# Patient Record
Sex: MALE | Race: WHITE | NOT HISPANIC OR LATINO | Employment: FULL TIME | URBAN - NONMETROPOLITAN AREA
[De-identification: names, ages, dates, MRNs, and addresses within clinical notes are randomized per-mention and may not be internally consistent; named-entity substitution may affect disease eponyms.]

---

## 2022-11-02 ENCOUNTER — OFFICE VISIT (OUTPATIENT)
Dept: FAMILY MEDICINE CLINIC | Facility: CLINIC | Age: 32
End: 2022-11-02

## 2022-11-02 VITALS
SYSTOLIC BLOOD PRESSURE: 149 MMHG | BODY MASS INDEX: 30.31 KG/M2 | TEMPERATURE: 98.7 F | RESPIRATION RATE: 16 BRPM | WEIGHT: 200 LBS | DIASTOLIC BLOOD PRESSURE: 97 MMHG | HEIGHT: 68 IN | OXYGEN SATURATION: 98 % | HEART RATE: 88 BPM

## 2022-11-02 DIAGNOSIS — R11.0 NAUSEA: ICD-10-CM

## 2022-11-02 DIAGNOSIS — J06.9 UPPER RESPIRATORY TRACT INFECTION, UNSPECIFIED TYPE: Primary | ICD-10-CM

## 2022-11-02 DIAGNOSIS — R10.9 ABDOMINAL PAIN, UNSPECIFIED ABDOMINAL LOCATION: ICD-10-CM

## 2022-11-02 DIAGNOSIS — J02.9 SORE THROAT: ICD-10-CM

## 2022-11-02 DIAGNOSIS — R50.9 FEVER, UNSPECIFIED FEVER CAUSE: ICD-10-CM

## 2022-11-02 LAB
EXPIRATION DATE: NORMAL
EXPIRATION DATE: NORMAL
FLUAV AG NPH QL: NEGATIVE
FLUBV AG NPH QL: NEGATIVE
INTERNAL CONTROL: NORMAL
INTERNAL CONTROL: NORMAL
Lab: NORMAL
Lab: NORMAL
S PYO AG THROAT QL: NEGATIVE

## 2022-11-02 PROCEDURE — 87804 INFLUENZA ASSAY W/OPTIC: CPT | Performed by: NURSE PRACTITIONER

## 2022-11-02 PROCEDURE — 99203 OFFICE O/P NEW LOW 30 MIN: CPT | Performed by: NURSE PRACTITIONER

## 2022-11-02 PROCEDURE — 87880 STREP A ASSAY W/OPTIC: CPT | Performed by: NURSE PRACTITIONER

## 2022-11-02 RX ORDER — BROMPHENIRAMINE MALEATE, PSEUDOEPHEDRINE HYDROCHLORIDE, AND DEXTROMETHORPHAN HYDROBROMIDE 2; 30; 10 MG/5ML; MG/5ML; MG/5ML
5 SYRUP ORAL 4 TIMES DAILY PRN
Qty: 118 ML | Refills: 0 | Status: SHIPPED | OUTPATIENT
Start: 2022-11-02

## 2022-11-02 NOTE — PROGRESS NOTES
Subjective     Chief Complaint   Patient presents with   • Fever   • Sore Throat   • Abdominal Pain   • Nausea       History of Present Illness    Patient presents today with chills, sore throat, dry cough and nausea.  Symptoms started 2 days ago with sore throat.  He took tylenol yesterday but not this morning.  Nausea started yesterday, but denies vomiting.  He has been able to eat.     Patient's PMR from outside medical facility reviewed and noted.    Review of Systems   Constitutional: Positive for chills. Negative for fever.   HENT: Positive for congestion, rhinorrhea and sore throat. Negative for sinus pressure.    Respiratory: Positive for cough.    Gastrointestinal: Positive for nausea. Negative for diarrhea and vomiting.   Neurological: Negative for headaches.        Otherwise complete ROS reviewed and negative except as mentioned in the HPI.    Past Medical History: History reviewed. No pertinent past medical history.  Past Surgical History:History reviewed. No pertinent surgical history.  Social History:  reports that he has been smoking cigarettes. He has a 13.00 pack-year smoking history. He has never used smokeless tobacco. He reports that he does not drink alcohol and does not use drugs.    Family History: family history includes Diabetes in his father and mother.      Allergies:  Allergies   Allergen Reactions   • Penicillins Hives     Medications:  Prior to Admission medications    Not on File         PHQ-9 Depression Screening  Little interest or pleasure in doing things? 0-->not at all   Feeling down, depressed, or hopeless? 0-->not at all   Trouble falling or staying asleep, or sleeping too much?     Feeling tired or having little energy?     Poor appetite or overeating?     Feeling bad about yourself - or that you are a failure or have let yourself or your family down?     Trouble concentrating on things, such as reading the newspaper or watching television?     Moving or speaking so  "slowly that other people could have noticed? Or the opposite - being so fidgety or restless that you have been moving around a lot more than usual?     Thoughts that you would be better off dead, or of hurting yourself in some way?     PHQ-9 Total Score 0   If you checked off any problems, how difficult have these problems made it for you to do your work, take care of things at home, or get along with other people?         PHQ-9 Total Score: 0   0 (Negative screening for depression)      Objective     Vital Signs: /97 (BP Location: Right arm, Patient Position: Sitting)   Pulse 88   Temp 98.7 °F (37.1 °C) (Infrared)   Resp 16   Ht 172.7 cm (68\")   Wt 90.7 kg (200 lb)   SpO2 98%   BMI 30.41 kg/m²   Physical Exam  Vitals and nursing note reviewed.   Constitutional:       Appearance: Normal appearance.   HENT:      Head: Normocephalic.      Right Ear: Tympanic membrane normal. There is impacted cerumen.      Left Ear: Tympanic membrane normal. There is impacted cerumen.      Nose: Congestion and rhinorrhea present.      Mouth/Throat:      Mouth: Mucous membranes are moist.      Pharynx: Posterior oropharyngeal erythema present. No oropharyngeal exudate.   Eyes:      Extraocular Movements: Extraocular movements intact.      Pupils: Pupils are equal, round, and reactive to light.   Cardiovascular:      Rate and Rhythm: Normal rate and regular rhythm.      Pulses: Normal pulses.      Heart sounds: Normal heart sounds.   Pulmonary:      Effort: Pulmonary effort is normal.      Breath sounds: Normal breath sounds.   Abdominal:      General: Bowel sounds are normal.      Palpations: Abdomen is soft.   Musculoskeletal:         General: Normal range of motion.      Cervical back: Normal range of motion.   Skin:     General: Skin is warm and dry.   Neurological:      General: No focal deficit present.      Mental Status: He is alert and oriented to person, place, and time.   Psychiatric:         Mood and Affect: " Mood normal.         Behavior: Behavior normal.         Thought Content: Thought content normal.         Judgment: Judgment normal.          Results Reviewed:  No results found for: GLUCOSE, BUN, CREATININE, NA, K, CL, CO2, CALCIUM, ALT, AST, WBC, HCT, PLT, CHOL, TRIG, HDL, LDL, LDLHDL, HGBA1C    POCT Influenza A/B (11/02/2022 10:20)  POCT rapid strep A (11/02/2022 10:19)      Assessment / Plan     Assessment/Plan     Diagnoses and all orders for this visit:    1. Upper respiratory tract infection, unspecified type (Primary)  -     brompheniramine-pseudoephedrine-DM 30-2-10 MG/5ML syrup; Take 5 mL by mouth 4 (Four) Times a Day As Needed for Congestion or Cough.  Dispense: 118 mL; Refill: 0    2. Fever, unspecified fever cause  -     POCT rapid strep A  -     POCT Influenza A/B    3. Nausea  -     POCT rapid strep A  -     POCT Influenza A/B    4. Abdominal pain, unspecified abdominal location  -     POCT rapid strep A  -     POCT Influenza A/B    5. Sore throat  -     POCT rapid strep A  -     POCT Influenza A/B         An After Visit Summary was printed and given to the patient at discharge.  Return if symptoms worsen or fail to improve.    I have discussed the patient results/orders and plan/recommendation with them at today's visit.      Katie Nathan, TOREY   11/02/2022